# Patient Record
Sex: FEMALE | Race: BLACK OR AFRICAN AMERICAN | URBAN - METROPOLITAN AREA
[De-identification: names, ages, dates, MRNs, and addresses within clinical notes are randomized per-mention and may not be internally consistent; named-entity substitution may affect disease eponyms.]

---

## 2022-10-14 ENCOUNTER — WEB ENCOUNTER (OUTPATIENT)
Dept: URBAN - METROPOLITAN AREA CLINIC 107 | Facility: CLINIC | Age: 27
End: 2022-10-14

## 2022-10-14 ENCOUNTER — OFFICE VISIT (OUTPATIENT)
Dept: URBAN - METROPOLITAN AREA CLINIC 107 | Facility: CLINIC | Age: 27
End: 2022-10-14
Payer: COMMERCIAL

## 2022-10-14 VITALS
BODY MASS INDEX: 29.16 KG/M2 | TEMPERATURE: 97.8 F | HEART RATE: 81 BPM | HEIGHT: 65 IN | DIASTOLIC BLOOD PRESSURE: 62 MMHG | SYSTOLIC BLOOD PRESSURE: 96 MMHG | RESPIRATION RATE: 18 BRPM | WEIGHT: 175 LBS

## 2022-10-14 DIAGNOSIS — K59.01 CONSTIPATION BY DELAYED COLONIC TRANSIT: ICD-10-CM

## 2022-10-14 DIAGNOSIS — A09 ACUTE INFECTIOUS DIARRHEA: ICD-10-CM

## 2022-10-14 PROCEDURE — 99203 OFFICE O/P NEW LOW 30 MIN: CPT | Performed by: INTERNAL MEDICINE

## 2022-10-14 NOTE — HPI-TODAY'S VISIT:
Mary Jane Prabhakar is a 27-year-old female who was seen in the emergency room on August 1, 2022 with a 1 day history of nausea, vomiting, and diarrhea.  A CT scan done at that time showed some left colon wall thickening consistent with colitis.  Nausea vomiting and diarrhea subsequently resolved.  Notably, the patient does have a chronic history of constipation dating back to childhood.  Her father had "GI issues" including colon polyps.  She has continued to have complaints of bloating and constipation, and these persist currently.  She denies rectal bleeding, fever, chills, sweats currently, etc.  There is a family history of colon cancer in a maternal grandmother..

## 2022-10-29 ENCOUNTER — DASHBOARD ENCOUNTERS (OUTPATIENT)
Age: 27
End: 2022-10-29

## 2022-10-29 PROBLEM — 35298007: Status: ACTIVE | Noted: 2022-10-29
